# Patient Record
Sex: MALE | Race: WHITE | HISPANIC OR LATINO | Employment: UNEMPLOYED | URBAN - METROPOLITAN AREA
[De-identification: names, ages, dates, MRNs, and addresses within clinical notes are randomized per-mention and may not be internally consistent; named-entity substitution may affect disease eponyms.]

---

## 2023-03-02 ENCOUNTER — HOSPITAL ENCOUNTER (EMERGENCY)
Facility: HOSPITAL | Age: 33
Discharge: HOME | End: 2023-03-02
Attending: EMERGENCY MEDICINE

## 2023-03-02 VITALS
HEIGHT: 69 IN | DIASTOLIC BLOOD PRESSURE: 72 MMHG | OXYGEN SATURATION: 95 % | BODY MASS INDEX: 37.77 KG/M2 | TEMPERATURE: 99 F | WEIGHT: 255 LBS | SYSTOLIC BLOOD PRESSURE: 128 MMHG | HEART RATE: 81 BPM | RESPIRATION RATE: 16 BRPM

## 2023-03-02 DIAGNOSIS — R04.0 EPISTAXIS: Primary | ICD-10-CM

## 2023-03-02 DIAGNOSIS — R05.9 COUGH, UNSPECIFIED TYPE: ICD-10-CM

## 2023-03-02 DIAGNOSIS — R09.81 NASAL CONGESTION: ICD-10-CM

## 2023-03-02 DIAGNOSIS — J06.9 UPPER RESPIRATORY TRACT INFECTION, UNSPECIFIED TYPE: ICD-10-CM

## 2023-03-02 PROCEDURE — 25000003 PHARM REV CODE 250: Mod: ER | Performed by: EMERGENCY MEDICINE

## 2023-03-02 PROCEDURE — 99283 EMERGENCY DEPT VISIT LOW MDM: CPT | Mod: ER

## 2023-03-02 RX ORDER — BENZONATATE 100 MG/1
100 CAPSULE ORAL 3 TIMES DAILY PRN
Qty: 30 CAPSULE | Refills: 0 | Status: SHIPPED | OUTPATIENT
Start: 2023-03-02 | End: 2023-03-12

## 2023-03-02 RX ORDER — OXYMETAZOLINE HCL 0.05 %
1 SPRAY, NON-AEROSOL (ML) NASAL
Status: COMPLETED | OUTPATIENT
Start: 2023-03-02 | End: 2023-03-02

## 2023-03-02 RX ADMIN — BACITRACIN ZINC, NEOMYCIN SULFATE, POLYMYXIN B SULFATE 1 EACH: 3.5; 5000; 4 OINTMENT TOPICAL at 01:03

## 2023-03-02 RX ADMIN — OXYMETAZOLINE HYDROCHLORIDE 1 SPRAY: 0.05 SPRAY NASAL at 01:03

## 2023-03-02 NOTE — ED NOTES
Reviewed discharge instructions and Rx with pt.  MD made sure to review care at home and follow up with pt.  PT verbalized understanding  Pt ambulatory and stable at time of discharge.

## 2023-03-02 NOTE — DISCHARGE INSTRUCTIONS
Follow up with the primary care provider within the next 1 week.  Recommend Neosporin or Vaseline placed on the inside of the nose to prevent nose dryness.    1. Horacio un seguimiento con el proveedor de atención primaria dentro de la próxima semana.  2. Recomendar Neosporin o Vaseline colocados en el interior de la nariz para evitar la sequedad de la nariz.

## 2023-03-02 NOTE — ED PROVIDER NOTES
Encounter Date: 3/2/2023       History     Chief Complaint   Patient presents with    Epistaxis     Intermittent nose bleeds x2 days with URI symptoms.      32-year-old male presenting with epistaxis.  No known health issues.  Patient reports over the last 1 week has had nasal congestion and sneezing.  Patient also reports bilateral eye irritation.  Patient reports over the last 2 days he has had intermittent episodes of nosebleeds.  Patient denies any drug use.  Patient denies any pain.  No over-the-counter medications used.  Tonight the patient was sleeping when the nose bleed began again.    Review of patient's allergies indicates:  No Known Allergies  History reviewed. No pertinent past medical history.  History reviewed. No pertinent surgical history.  History reviewed. No pertinent family history.  Social History     Tobacco Use    Smoking status: Never    Smokeless tobacco: Never   Substance Use Topics    Alcohol use: Yes     Comment: occasionally    Drug use: Never     Review of Systems   Constitutional:  Negative for chills and fever.   HENT:  Positive for congestion, nosebleeds, rhinorrhea and sneezing. Negative for ear pain, postnasal drip and sore throat.    Respiratory:  Negative for cough and shortness of breath.    Gastrointestinal:  Negative for abdominal pain and nausea.   Neurological:  Negative for headaches.     Physical Exam     Initial Vitals [03/02/23 0128]   BP Pulse Resp Temp SpO2   131/73 108 18 98.6 °F (37 °C) 98 %      MAP       --         Physical Exam    Nursing note and vitals reviewed.  Constitutional: He appears well-developed. He is not diaphoretic. No distress.   HENT:   Head: Normocephalic.   Mouth/Throat: Uvula is midline and oropharynx is clear and moist.   Dried blood around the nares.  No active epistaxis.  No nasal septum wound   Eyes: EOM are normal. Right conjunctiva is injected. Left conjunctiva is not injected.   Cardiovascular:  Normal rate and regular rhythm.            No murmur heard.  Pulmonary/Chest: Effort normal and breath sounds normal. He has no wheezes.   Abdominal: Abdomen is soft. He exhibits no distension. There is no abdominal tenderness.   Musculoskeletal:         General: Normal range of motion.     Neurological: He is alert.   Skin: Skin is warm.       ED Course   Procedures  Labs Reviewed - No data to display       Imaging Results    None          Medications   oxymetazoline 0.05 % nasal spray 1 spray (1 spray Each Nostril Given 3/2/23 0135)   neomycin-bacitracnZn-polymyxnB packet 1 each (1 each Topical (Top) Given 3/2/23 0139)     Medical Decision Making:   Initial Assessment:     32-year-old male presenting with epistaxis.  No active epistaxis during evaluation.  Patient does have URI symptoms likely the cause of his epistaxis.  Bacitracin ointment provided.  Discussed using Aquaphor or Vaseline as needed to keep the nares moisturize.  Patient otherwise non ill-appearing at this time.                        Clinical Impression:   Final diagnoses:  [R04.0] Epistaxis (Primary)  [J06.9] Upper respiratory tract infection, unspecified type  [R09.81] Nasal congestion  [R05.9] Cough, unspecified type        ED Disposition Condition    Discharge Stable          ED Prescriptions       Medication Sig Dispense Start Date End Date Auth. Provider    benzonatate (TESSALON) 100 MG capsule Take 1 capsule (100 mg total) by mouth 3 (three) times daily as needed for Cough. 30 capsule 3/2/2023 3/12/2023 Angel Maldonado MD          Follow-up Information       Follow up With Specialties Details Why Contact [facility]  [facility] 1 [facility] [address] [facility] [address]  [phone]    [facility] - Emergency Dept Emergency Medicine  If symptoms worsen [address] [facility] [address] [phone]             Angel Maldonado MD  03/02/23 1652